# Patient Record
Sex: MALE | Race: WHITE | NOT HISPANIC OR LATINO | URBAN - METROPOLITAN AREA
[De-identification: names, ages, dates, MRNs, and addresses within clinical notes are randomized per-mention and may not be internally consistent; named-entity substitution may affect disease eponyms.]

---

## 2023-10-18 DIAGNOSIS — J40 BRONCHITIS: Primary | ICD-10-CM

## 2023-10-18 RX ORDER — DOXYCYCLINE 100 MG/1
100 CAPSULE ORAL 2 TIMES DAILY
Qty: 28 CAPSULE | Refills: 0 | Status: SHIPPED | OUTPATIENT
Start: 2023-10-18 | End: 2023-10-28

## 2025-05-30 ENCOUNTER — OFFICE VISIT (OUTPATIENT)
Dept: URGENT CARE | Age: 55
End: 2025-05-30
Payer: COMMERCIAL

## 2025-05-30 VITALS
OXYGEN SATURATION: 96 % | HEART RATE: 79 BPM | SYSTOLIC BLOOD PRESSURE: 151 MMHG | WEIGHT: 230 LBS | RESPIRATION RATE: 17 BRPM | DIASTOLIC BLOOD PRESSURE: 93 MMHG | BODY MASS INDEX: 32.2 KG/M2 | TEMPERATURE: 98.1 F | HEIGHT: 71 IN

## 2025-05-30 DIAGNOSIS — J06.9 UPPER RESPIRATORY TRACT INFECTION, UNSPECIFIED TYPE: ICD-10-CM

## 2025-05-30 DIAGNOSIS — J02.9 SORE THROAT: ICD-10-CM

## 2025-05-30 LAB
POC HUMAN RHINOVIRUS PCR: NEGATIVE
POC INFLUENZA A VIRUS PCR: NEGATIVE
POC INFLUENZA B VIRUS PCR: NEGATIVE
POC RESPIRATORY SYNCYTIAL VIRUS PCR: NEGATIVE
POC STREPTOCOCCUS PYOGENES (GROUP A STREP) PCR: NEGATIVE

## 2025-05-30 RX ORDER — ROSUVASTATIN CALCIUM 20 MG/1
10 TABLET, COATED ORAL
COMMUNITY

## 2025-05-30 RX ORDER — SILDENAFIL 100 MG/1
TABLET, FILM COATED ORAL
COMMUNITY
Start: 2021-05-14

## 2025-05-30 ASSESSMENT — ENCOUNTER SYMPTOMS
LOSS OF SENSATION IN FEET: 0
SORE THROAT: 1
DIAPHORESIS: 0
CHILLS: 0
FEVER: 0
RHINORRHEA: 0
SHORTNESS OF BREATH: 0
DEPRESSION: 0
OCCASIONAL FEELINGS OF UNSTEADINESS: 0
COUGH: 0

## 2025-05-30 ASSESSMENT — PAIN SCALES - GENERAL: PAINLEVEL_OUTOF10: 5

## 2025-05-30 NOTE — PROGRESS NOTES
"Subjective   Patient ID: Kevin Hargrove is a 54 y.o. male. They present today with a chief complaint of Sore Throat (Sore throat 4 days ).    History of Present Illness  Sore throat x 4 days.    Denies fever, chills, sweats, chest pain, SOB, nasal congestion/rhinorrhea, ear pain.    Hx of strep throat.       Sore Throat   Pertinent negatives include no congestion, coughing, ear pain or shortness of breath.       Past Medical History  Allergies as of 05/30/2025    (No Known Allergies)       Prescriptions Prior to Admission[1]     Medical History[2]    Surgical History[3]     reports that he has never smoked. He has never used smokeless tobacco.    Review of Systems  Review of Systems   Constitutional:  Negative for chills, diaphoresis and fever.   HENT:  Positive for sore throat. Negative for congestion, ear pain and rhinorrhea.    Respiratory:  Negative for cough and shortness of breath.    Cardiovascular:  Negative for chest pain.   All other systems reviewed and are negative.                                 Objective    Vitals:    05/30/25 1555   BP: (!) 151/93   BP Location: Left arm   Patient Position: Sitting   BP Cuff Size: Small adult   Pulse: 79   Resp: 17   Temp: 36.7 °C (98.1 °F)   TempSrc: Oral   SpO2: 96%   Weight: 104 kg (230 lb)   Height: 1.803 m (5' 11\")     No LMP for male patient.    Physical Exam  Vitals and nursing note reviewed.   Constitutional:       General: He is not in acute distress.  HENT:      Right Ear: Tympanic membrane normal.      Left Ear: Tympanic membrane normal.      Nose: No congestion or rhinorrhea.      Mouth/Throat:      Pharynx: No oropharyngeal exudate or posterior oropharyngeal erythema.   Cardiovascular:      Rate and Rhythm: Normal rate and regular rhythm.   Pulmonary:      Effort: Pulmonary effort is normal.      Breath sounds: Normal breath sounds.   Neurological:      Mental Status: He is alert.         Procedures    Point of Care Test & Imaging Results from this " visit  No results found for this visit on 05/30/25.   Imaging  No results found.    Cardiology, Vascular, and Other Imaging  No other imaging results found for the past 2 days      Diagnostic study results (if any) were reviewed by Lulu Guerrero PA-C.    Assessment/Plan   Allergies, medications, history, and pertinent labs/EKGs/Imaging reviewed by Lulu Guerrero PA-C.     Orders and Diagnoses  There are no diagnoses linked to this encounter.    Medical Admin Record      Patient disposition: Home    Electronically signed by Lulu Guerrero PA-C  4:07 PM           [1] (Not in a hospital admission)  [2] History reviewed. No pertinent past medical history.  [3] History reviewed. No pertinent surgical history.

## 2025-05-30 NOTE — PATIENT INSTRUCTIONS
alternate ibuprofen, 800mg max (with food), and tylenol, 1000mg max, every 4 hours as needed for discomfort/fever    gargle with 1 tsp salt in 8 oz of warm water or listerine original, for sore throat.    use cepacol throat lozenges/chloroseptic spray, for sore throat    follow up with primary care if no improvement in 3 - 4 days.